# Patient Record
Sex: FEMALE | Race: WHITE | NOT HISPANIC OR LATINO | Employment: FULL TIME | URBAN - METROPOLITAN AREA
[De-identification: names, ages, dates, MRNs, and addresses within clinical notes are randomized per-mention and may not be internally consistent; named-entity substitution may affect disease eponyms.]

---

## 2017-06-13 DIAGNOSIS — Z12.31 SCREENING MAMMOGRAM FOR HIGH-RISK PATIENT: Primary | ICD-10-CM

## 2017-06-22 ENCOUNTER — HOSPITAL ENCOUNTER (OUTPATIENT)
Dept: RADIOLOGY | Facility: HOSPITAL | Age: 72
Discharge: HOME | End: 2017-06-22
Attending: OBSTETRICS & GYNECOLOGY
Payer: MEDICARE

## 2017-06-22 VITALS — HEIGHT: 61 IN | WEIGHT: 182 LBS | BODY MASS INDEX: 34.36 KG/M2

## 2017-06-22 DIAGNOSIS — Z12.31 SCREENING MAMMOGRAM FOR HIGH-RISK PATIENT: ICD-10-CM

## 2017-06-22 PROCEDURE — 77067 SCR MAMMO BI INCL CAD: CPT | Mod: TC

## 2017-06-22 PROCEDURE — 77067 SCR MAMMO BI INCL CAD: CPT | Mod: 26,,, | Performed by: RADIOLOGY

## 2017-06-22 PROCEDURE — 77063 BREAST TOMOSYNTHESIS BI: CPT | Mod: 26,,, | Performed by: RADIOLOGY

## 2018-07-31 DIAGNOSIS — Z12.31 VISIT FOR SCREENING MAMMOGRAM: Primary | ICD-10-CM

## 2018-08-15 ENCOUNTER — HOSPITAL ENCOUNTER (OUTPATIENT)
Dept: RADIOLOGY | Facility: HOSPITAL | Age: 73
Discharge: HOME | End: 2018-08-15
Attending: OBSTETRICS & GYNECOLOGY
Payer: MEDICARE

## 2018-08-15 VITALS — BODY MASS INDEX: 34.39 KG/M2 | WEIGHT: 182 LBS

## 2018-08-15 DIAGNOSIS — Z12.31 VISIT FOR SCREENING MAMMOGRAM: ICD-10-CM

## 2018-08-15 PROCEDURE — 77067 SCR MAMMO BI INCL CAD: CPT | Mod: TC

## 2018-08-15 PROCEDURE — 77067 SCR MAMMO BI INCL CAD: CPT | Mod: 26,,, | Performed by: RADIOLOGY

## 2019-09-16 DIAGNOSIS — Z12.39 SCREENING BREAST EXAMINATION: Primary | ICD-10-CM

## 2019-09-25 ENCOUNTER — HOSPITAL ENCOUNTER (OUTPATIENT)
Dept: RADIOLOGY | Facility: HOSPITAL | Age: 74
Discharge: HOME | End: 2019-09-25
Attending: OBSTETRICS & GYNECOLOGY
Payer: MEDICARE

## 2019-09-25 VITALS — BODY MASS INDEX: 34.39 KG/M2 | WEIGHT: 182 LBS

## 2019-09-25 DIAGNOSIS — Z12.39 SCREENING BREAST EXAMINATION: ICD-10-CM

## 2019-09-25 PROCEDURE — 77067 SCR MAMMO BI INCL CAD: CPT | Mod: TC

## 2019-09-25 PROCEDURE — 77063 BREAST TOMOSYNTHESIS BI: CPT | Mod: 26,,, | Performed by: RADIOLOGY

## 2019-09-25 PROCEDURE — 77067 MAMMO DIGITAL SCREENING BILAT WITH TOMOSYNTHESIS_CAD: ICD-10-PCS | Mod: 26,,, | Performed by: RADIOLOGY

## 2019-09-25 PROCEDURE — 77063 MAMMO DIGITAL SCREENING BILAT WITH TOMOSYNTHESIS_CAD: ICD-10-PCS | Mod: 26,,, | Performed by: RADIOLOGY

## 2019-09-25 PROCEDURE — 77067 SCR MAMMO BI INCL CAD: CPT | Mod: 26,,, | Performed by: RADIOLOGY

## 2020-11-30 DIAGNOSIS — Z12.31 VISIT FOR SCREENING MAMMOGRAM: Primary | ICD-10-CM

## 2020-12-03 ENCOUNTER — HOSPITAL ENCOUNTER (OUTPATIENT)
Dept: RADIOLOGY | Facility: HOSPITAL | Age: 75
Discharge: HOME | End: 2020-12-03
Attending: OBSTETRICS & GYNECOLOGY
Payer: MEDICARE

## 2020-12-03 DIAGNOSIS — Z12.31 VISIT FOR SCREENING MAMMOGRAM: ICD-10-CM

## 2020-12-03 PROCEDURE — 77063 BREAST TOMOSYNTHESIS BI: CPT | Mod: 26,,, | Performed by: RADIOLOGY

## 2020-12-03 PROCEDURE — 77063 MAMMO DIGITAL SCREENING BILAT WITH TOMO: ICD-10-PCS | Mod: 26,,, | Performed by: RADIOLOGY

## 2020-12-03 PROCEDURE — 77067 MAMMO DIGITAL SCREENING BILAT WITH TOMO: ICD-10-PCS | Mod: 26,,, | Performed by: RADIOLOGY

## 2020-12-03 PROCEDURE — 77067 SCR MAMMO BI INCL CAD: CPT | Mod: 26,,, | Performed by: RADIOLOGY

## 2020-12-03 PROCEDURE — 77067 SCR MAMMO BI INCL CAD: CPT | Mod: TC

## 2020-12-06 DIAGNOSIS — Z12.31 SCREENING MAMMOGRAM, ENCOUNTER FOR: Primary | ICD-10-CM

## 2021-04-16 ENCOUNTER — PATIENT MESSAGE (OUTPATIENT)
Dept: RESEARCH | Facility: HOSPITAL | Age: 76
End: 2021-04-16

## 2022-03-10 ENCOUNTER — HOSPITAL ENCOUNTER (OUTPATIENT)
Dept: RADIOLOGY | Facility: HOSPITAL | Age: 77
Discharge: HOME | End: 2022-03-10
Attending: OBSTETRICS & GYNECOLOGY
Payer: MEDICARE

## 2022-03-10 VITALS — WEIGHT: 178 LBS | BODY MASS INDEX: 33.63 KG/M2

## 2022-03-10 DIAGNOSIS — Z12.31 VISIT FOR SCREENING MAMMOGRAM: ICD-10-CM

## 2022-03-10 PROCEDURE — 77063 BREAST TOMOSYNTHESIS BI: CPT | Mod: TC

## 2022-03-10 PROCEDURE — 77067 SCR MAMMO BI INCL CAD: CPT | Mod: 26,,, | Performed by: RADIOLOGY

## 2022-03-10 PROCEDURE — 77063 BREAST TOMOSYNTHESIS BI: CPT | Mod: 26,,, | Performed by: RADIOLOGY

## 2022-03-10 PROCEDURE — 77063 MAMMO DIGITAL SCREENING BILAT WITH TOMO: ICD-10-PCS | Mod: 26,,, | Performed by: RADIOLOGY

## 2022-03-10 PROCEDURE — 77067 MAMMO DIGITAL SCREENING BILAT WITH TOMO: ICD-10-PCS | Mod: 26,,, | Performed by: RADIOLOGY

## 2022-10-23 ENCOUNTER — HOSPITAL ENCOUNTER (OUTPATIENT)
Facility: HOSPITAL | Age: 77
Discharge: HOME | End: 2022-10-24
Attending: EMERGENCY MEDICINE | Admitting: EMERGENCY MEDICINE
Payer: MEDICARE

## 2022-10-23 DIAGNOSIS — D72.829 LEUKOCYTOSIS, UNSPECIFIED TYPE: Primary | ICD-10-CM

## 2022-10-23 DIAGNOSIS — R07.9 CHEST PAIN: ICD-10-CM

## 2022-10-23 PROBLEM — G47.33 OSA (OBSTRUCTIVE SLEEP APNEA): Status: ACTIVE | Noted: 2022-10-23

## 2022-10-23 LAB
ALBUMIN SERPL-MCNC: 4.3 G/DL (ref 3.3–5.5)
ALP SERPL-CCNC: 68 U/L (ref 42–141)
BILIRUB SERPL-MCNC: 0.6 MG/DL (ref 0.2–1.6)
BILIRUBIN, POC UA: NEGATIVE
BLOOD, POC UA: ABNORMAL
BUN SERPL-MCNC: 19 MG/DL (ref 7–22)
CALCIUM SERPL-MCNC: 10.2 MG/DL (ref 8–10.3)
CHLORIDE SERPL-SCNC: 106 MMOL/L (ref 98–108)
CLARITY, POC UA: CLEAR
COLOR, POC UA: YELLOW
CREAT SERPL-MCNC: 1.2 MG/DL (ref 0.6–1.2)
GLUCOSE SERPL-MCNC: 148 MG/DL (ref 73–118)
GLUCOSE, POC UA: NEGATIVE
INFLUENZA A ANTIGEN, POC: NEGATIVE
INFLUENZA B ANTIGEN, POC: NEGATIVE
KETONES, POC UA: NEGATIVE
LEUKOCYTE EST, POC UA: NEGATIVE
NITRITE, POC UA: NEGATIVE
PH UR STRIP: 5.5 [PH]
POC ALT (SGPT): 24 U/L (ref 10–47)
POC AST (SGOT): 34 U/L (ref 11–38)
POC CARDIAC TROPONIN I: 0.01 NG/ML
POC CARDIAC TROPONIN I: 0.01 NG/ML
POC PTINR: 1.2 (ref 0.9–1.2)
POC PTWBT: 13.7 SEC (ref 9.7–14.3)
POC TCO2: 26 MMOL/L (ref 18–33)
POTASSIUM BLD-SCNC: 4.8 MMOL/L (ref 3.6–5.1)
PROTEIN, POC UA: NEGATIVE
PROTEIN, POC: 8.1 G/DL (ref 6.4–8.1)
SAMPLE: NORMAL
SODIUM BLD-SCNC: 146 MMOL/L (ref 128–145)
SPECIFIC GRAVITY, POC UA: 1.02
TROPONIN I SERPL DL<=0.01 NG/ML-MCNC: 0.03 NG/ML (ref 0–0.03)
UROBILINOGEN, POC UA: 0.2 E.U./DL

## 2022-10-23 PROCEDURE — 84484 ASSAY OF TROPONIN QUANT: CPT | Mod: 91,ER

## 2022-10-23 PROCEDURE — G0378 HOSPITAL OBSERVATION PER HR: HCPCS

## 2022-10-23 PROCEDURE — 36415 COLL VENOUS BLD VENIPUNCTURE: CPT | Performed by: PHYSICIAN ASSISTANT

## 2022-10-23 PROCEDURE — 84484 ASSAY OF TROPONIN QUANT: CPT | Mod: ER

## 2022-10-23 PROCEDURE — 85610 PROTHROMBIN TIME: CPT | Mod: ER

## 2022-10-23 PROCEDURE — 80053 COMPREHEN METABOLIC PANEL: CPT | Mod: ER

## 2022-10-23 PROCEDURE — 93010 ELECTROCARDIOGRAM REPORT: CPT | Mod: ,,, | Performed by: INTERNAL MEDICINE

## 2022-10-23 PROCEDURE — 84484 ASSAY OF TROPONIN QUANT: CPT | Mod: 59 | Performed by: PHYSICIAN ASSISTANT

## 2022-10-23 PROCEDURE — 85025 COMPLETE CBC W/AUTO DIFF WBC: CPT | Mod: ER

## 2022-10-23 PROCEDURE — 25000003 PHARM REV CODE 250: Performed by: STUDENT IN AN ORGANIZED HEALTH CARE EDUCATION/TRAINING PROGRAM

## 2022-10-23 PROCEDURE — 81003 URINALYSIS AUTO W/O SCOPE: CPT | Mod: ER

## 2022-10-23 PROCEDURE — 93010 EKG 12-LEAD: ICD-10-PCS | Mod: ,,, | Performed by: INTERNAL MEDICINE

## 2022-10-23 PROCEDURE — 99285 EMERGENCY DEPT VISIT HI MDM: CPT | Mod: 25,ER

## 2022-10-23 PROCEDURE — 25000003 PHARM REV CODE 250: Performed by: PHYSICIAN ASSISTANT

## 2022-10-23 PROCEDURE — 25000003 PHARM REV CODE 250: Mod: ER | Performed by: NURSE PRACTITIONER

## 2022-10-23 PROCEDURE — 93005 ELECTROCARDIOGRAM TRACING: CPT | Mod: ER

## 2022-10-23 RX ORDER — DICYCLOMINE HYDROCHLORIDE 10 MG/5ML
20 SOLUTION ORAL
Status: COMPLETED | OUTPATIENT
Start: 2022-10-23 | End: 2022-10-23

## 2022-10-23 RX ORDER — EZETIMIBE 10 MG/1
10 TABLET ORAL NIGHTLY
Status: DISCONTINUED | OUTPATIENT
Start: 2022-10-23 | End: 2022-10-23

## 2022-10-23 RX ORDER — ASPIRIN 325 MG
325 TABLET ORAL
Status: DISCONTINUED | OUTPATIENT
Start: 2022-10-23 | End: 2022-10-23

## 2022-10-23 RX ORDER — IBUPROFEN 200 MG
16 TABLET ORAL
Status: DISCONTINUED | OUTPATIENT
Start: 2022-10-23 | End: 2022-10-24 | Stop reason: HOSPADM

## 2022-10-23 RX ORDER — ROSUVASTATIN CALCIUM 10 MG/1
10 TABLET, COATED ORAL NIGHTLY
Status: DISCONTINUED | OUTPATIENT
Start: 2022-10-23 | End: 2022-10-24 | Stop reason: HOSPADM

## 2022-10-23 RX ORDER — LIDOCAINE 50 MG/G
1 PATCH TOPICAL
Status: DISCONTINUED | OUTPATIENT
Start: 2022-10-23 | End: 2022-10-24 | Stop reason: HOSPADM

## 2022-10-23 RX ORDER — SODIUM CHLORIDE 0.9 % (FLUSH) 0.9 %
10 SYRINGE (ML) INJECTION EVERY 8 HOURS
Status: DISCONTINUED | OUTPATIENT
Start: 2022-10-23 | End: 2022-10-23

## 2022-10-23 RX ORDER — SODIUM CHLORIDE 0.9 % (FLUSH) 0.9 %
10 SYRINGE (ML) INJECTION
Status: DISCONTINUED | OUTPATIENT
Start: 2022-10-23 | End: 2022-10-24 | Stop reason: HOSPADM

## 2022-10-23 RX ORDER — IBUPROFEN 200 MG
24 TABLET ORAL
Status: DISCONTINUED | OUTPATIENT
Start: 2022-10-23 | End: 2022-10-24 | Stop reason: HOSPADM

## 2022-10-23 RX ORDER — TALC
6 POWDER (GRAM) TOPICAL NIGHTLY PRN
Status: DISCONTINUED | OUTPATIENT
Start: 2022-10-23 | End: 2022-10-24 | Stop reason: HOSPADM

## 2022-10-23 RX ORDER — LIDOCAINE HYDROCHLORIDE 20 MG/ML
15 SOLUTION OROPHARYNGEAL
Status: COMPLETED | OUTPATIENT
Start: 2022-10-23 | End: 2022-10-23

## 2022-10-23 RX ORDER — LANOLIN ALCOHOL/MO/W.PET/CERES
800 CREAM (GRAM) TOPICAL
Status: DISCONTINUED | OUTPATIENT
Start: 2022-10-23 | End: 2022-10-24 | Stop reason: HOSPADM

## 2022-10-23 RX ORDER — NALOXONE HCL 0.4 MG/ML
0.02 VIAL (ML) INJECTION
Status: DISCONTINUED | OUTPATIENT
Start: 2022-10-23 | End: 2022-10-24 | Stop reason: HOSPADM

## 2022-10-23 RX ORDER — MAG HYDROX/ALUMINUM HYD/SIMETH 200-200-20
30 SUSPENSION, ORAL (FINAL DOSE FORM) ORAL
Status: COMPLETED | OUTPATIENT
Start: 2022-10-23 | End: 2022-10-23

## 2022-10-23 RX ORDER — FUROSEMIDE 20 MG/1
20 TABLET ORAL DAILY
COMMUNITY

## 2022-10-23 RX ORDER — NAPROXEN SODIUM 220 MG/1
81 TABLET, FILM COATED ORAL DAILY
Status: DISCONTINUED | OUTPATIENT
Start: 2022-10-24 | End: 2022-10-24 | Stop reason: HOSPADM

## 2022-10-23 RX ORDER — AMOXICILLIN 250 MG
1 CAPSULE ORAL DAILY PRN
Status: DISCONTINUED | OUTPATIENT
Start: 2022-10-23 | End: 2022-10-24 | Stop reason: HOSPADM

## 2022-10-23 RX ORDER — GLUCAGON 1 MG
1 KIT INJECTION
Status: DISCONTINUED | OUTPATIENT
Start: 2022-10-23 | End: 2022-10-24 | Stop reason: HOSPADM

## 2022-10-23 RX ORDER — FUROSEMIDE 20 MG/1
20 TABLET ORAL DAILY
Status: DISCONTINUED | OUTPATIENT
Start: 2022-10-24 | End: 2022-10-24 | Stop reason: HOSPADM

## 2022-10-23 RX ORDER — ACETAMINOPHEN 325 MG/1
650 TABLET ORAL EVERY 4 HOURS PRN
Status: DISCONTINUED | OUTPATIENT
Start: 2022-10-23 | End: 2022-10-24 | Stop reason: HOSPADM

## 2022-10-23 RX ORDER — EZETIMIBE 10 MG/1
10 TABLET ORAL DAILY
Status: DISCONTINUED | OUTPATIENT
Start: 2022-10-24 | End: 2022-10-24 | Stop reason: HOSPADM

## 2022-10-23 RX ORDER — ATORVASTATIN CALCIUM 10 MG/1
20 TABLET, FILM COATED ORAL DAILY
Status: DISCONTINUED | OUTPATIENT
Start: 2022-10-24 | End: 2022-10-23

## 2022-10-23 RX ADMIN — ROSUVASTATIN CALCIUM 10 MG: 10 TABLET, FILM COATED ORAL at 11:10

## 2022-10-23 RX ADMIN — SACUBITRIL AND VALSARTAN 2 TABLET: 24; 26 TABLET, FILM COATED ORAL at 03:10

## 2022-10-23 RX ADMIN — DICYCLOMINE HYDROCHLORIDE 20 MG: 10 SOLUTION ORAL at 10:10

## 2022-10-23 RX ADMIN — ALUMINUM HYDROXIDE, MAGNESIUM HYDROXIDE, AND DIMETHICONE 30 ML: 200; 20; 200 SUSPENSION ORAL at 10:10

## 2022-10-23 RX ADMIN — LIDOCAINE 1 PATCH: 50 PATCH TOPICAL at 03:10

## 2022-10-23 RX ADMIN — LIDOCAINE HYDROCHLORIDE 15 ML: 20 SOLUTION ORAL at 10:10

## 2022-10-23 NOTE — ASSESSMENT & PLAN NOTE
Presents with constant chest pain since 2am improved with GI cocktail in ED, and worsened with palpation of chest. EKG without ST elevation, troponin negative x2 in [place]. Possibly MSK related or GERD.  Troponin trend  Continue home aspirin/zetia substiute home rosuvastatin for atorvastatin  Telemetry  Echo  Will place lidocaine patch.

## 2022-10-23 NOTE — ED NOTES
No medication reactions, pt states no change in chest pressure at this time but states she is no longer belching.

## 2022-10-23 NOTE — H&P
"[facility]  History & Physical    Patient Name: Janice Rivera  MRN: [id number]  Patient Class: OP- Observation  Admission Date: 10/23/2022  Attending Physician: Sandy Gutierrez MD   Primary Care Provider: Juli Melendez MD         Patient information was obtained from patient, past medical records and ER records.     Subjective:     Principal Problem:Chest pain    Chief Complaint:   Chief Complaint   Patient presents with    Chest Pain    Fever     Pt states upper chest pain that woke her up this am around 2,  pt also c/o fever, cough and nausea that has subsided.  Pt states she has been belching.        HPI: Janice Rivera 77 y.o. female with HFpEF, polymalgia rheumatic no longer on Tx, JUNIOR presents to [facility] wit a chief complaint of chest pain.  She reports morning at 2:00 a.m. she developed sudden onset sternal chest pain at the base of her neck that she describes as a squeezing pain.  And has remained constant  Improved mildly with GI cocktail in the ED.  It worsens with palpation of her chest.  She attempted treatment at home with aspirin.  She reports sleeping on both of her sides, and is concerned this could have contributed to her chest pain as she states her bed is "old." She denies shortness breath nausea vomiting abdominal pain leg swelling syncope dizziness dysuria melena hematuria hematemesis new rashes or wounds.      In the ED, EKG without ST elevation, troponin negative, chest x-ray with pulmonary vascular congestion without overt edema.     Stress Test 2019 negative for ischemia.     04/24/18  Coronary calcium score is 209, 88th percentile.  Coronary age 79.  Ten year risk for a heart attack 10%, that would be intermediate to high risk.  Normal origin of the right and left main.  The left atrium is 35 mm.  Plaque also noted in the mid distal RCA below the Agatston's scoring threshold.  Progression from 2010 when the score was 32.  Liver 43, spleen 31.  " No evidence of fatty liver from this study.  Radiology overread showed a tiny hiatal hernia significantly decreased in size from the prior exam.      Past Medical History:   Diagnosis Date    Enlarged heart     Hypertension     Polymyalgia rheumatica        History reviewed. No pertinent surgical history.    Review of patient's allergies indicates:   Allergen Reactions    Hydrochlorothiazide        No current facility-administered medications on file prior to encounter.     Current Outpatient Medications on File Prior to Encounter   Medication Sig    amlodipine (NORVASC) 5 MG tablet Take 5 mg by mouth once daily.    aspirin 81 MG Chew Take 81 mg by mouth once daily.    atenolol (TENORMIN) 50 MG tablet Take 50 mg by mouth 2 (two) times daily.    biotin 1 mg tablet Take 1,000 mcg by mouth 3 (three) times daily.    CALCIUM CARBONATE/VITAMIN D3 (VITAMIN D-3 ORAL) Take 2,000 Int'l Units/day by mouth.    CALCIUM PHOSPHATE TRIB/VIT D3 (CITRACAL + D ORAL) Take by mouth.    folic acid (FOLVITE) 1 MG tablet Take 1 mg by mouth once daily.    methotrexate 5 MG tablet Take 10 mg by mouth once a week.    olmesartan (BENICAR) 20 MG tablet Take 20 mg by mouth once daily.    OMEGA-3 FATTY ACIDS/FISH OIL (OMEGA 3 FISH OIL ORAL) Take by mouth.    peg 400-propylene glycol 0.4-0.3 % Drop Apply to eye.    predniSONE (DELTASONE) 5 MG tablet Take 5 mg by mouth once daily.    pyridoxine (VITAMIN B-6) 100 MG Tab Take 50 mg by mouth once daily.    vitamin D 1000 units Tab Take 185 mg by mouth once daily.     Family History    Sister-CAD       Tobacco Use    Smoking status: Never    Smokeless tobacco: Not on file   Substance and Sexual Activity    Alcohol use: Not on file    Drug use: Not on file    Sexual activity: Not on file     Review of Systems   Constitutional:  Negative for chills and fever.   HENT:  Negative for nosebleeds and tinnitus.    Eyes:  Negative for photophobia and visual disturbance.   Respiratory:   Negative for shortness of breath and wheezing.    Cardiovascular:  Positive for chest pain. Negative for palpitations and leg swelling.   Gastrointestinal:  Negative for abdominal distention, nausea and vomiting.   Genitourinary:  Negative for dysuria, flank pain and hematuria.   Musculoskeletal:  Negative for gait problem and joint swelling.   Skin:  Negative for rash and wound.   Neurological:  Negative for seizures and syncope.   Objective:     Vital Signs (Most Recent):  Temp: 99 °F (37.2 °C) (10/23/22 1206)  Pulse: 75 (10/23/22 1231)  Resp: 17 (10/23/22 1101)  BP: (!) 157/74 (10/23/22 1231)  SpO2: 96 % (10/23/22 1231)   Vital Signs (24h Range):  Temp:  [98.9 °F (37.2 °C)-99.1 °F (37.3 °C)] 99 °F (37.2 °C)  Pulse:  [62-76] 75  Resp:  [17-20] 17  SpO2:  [96 %-99 %] 96 %  BP: (156-203)/(68-86) 157/74     Weight: 84.2 kg (185 lb 9.6 oz)  Body mass index is 35.07 kg/m².    Physical Exam  Vitals and nursing note reviewed.   Constitutional:       General: She is not in acute distress.     Appearance: She is well-developed.   HENT:      Head: Normocephalic and atraumatic.   Eyes:      General:         Right eye: No discharge.         Left eye: No discharge.      Conjunctiva/sclera: Conjunctivae normal.   Neck:      Thyroid: No thyromegaly.   Cardiovascular:      Rate and Rhythm: Normal rate and regular rhythm.      Heart sounds: No murmur heard.  Pulmonary:      Effort: Pulmonary effort is normal. No respiratory distress.      Breath sounds: Normal breath sounds.   Chest:      Chest wall: Tenderness (across sternal manubrium reproduces pain of cheif complaint) present.   Abdominal:      General: Bowel sounds are normal. There is no distension.      Palpations: Abdomen is soft. There is no mass.      Tenderness: There is no abdominal tenderness.   Musculoskeletal:         General: No deformity.      Cervical back: Normal range of motion.   Skin:     General: Skin is warm and dry.      Findings: No lesion or rash.    Neurological:      Mental Status: She is alert and oriented to person, place, and time.   Psychiatric:         Mood and Affect: Mood normal.         Behavior: Behavior normal.           Significant Labs:   CBC  WBC: 13.4  H&H: 15.6&47.5  PLTs: 270    CMP  Sodium: 146  Potassium: 4.8  Chloride: 106  BUN: 19  Cr: 1.2  Alk Phos: 68  Albumin: 4.3  AST/ALT: 34/24  Troponin: 0.01 and 0.01 INR: 1.2  Glucose: 148  Calcium: 10.2  Flu negative      UA negative for glucose, protein, ketones, nitrites, leukocytes    Significant Imaging:   Imaging Results              X-Ray Chest PA And Lateral (Final result)  Result time 10/23/22 11:13:14      Final result by Veronica Collins MD (10/23/22 11:13:14)                   Impression:      1. Pulmonary vascular congestion without definite edema.  2. Stable enlarged cardiac silhouette.  3. Osteopenia and degenerative change of the spine.      Electronically signed by: Veronica Collins MD  Date:    10/23/2022  Time:    11:13               Narrative:    EXAMINATION:  XR CHEST PA AND LATERAL    CLINICAL HISTORY:  Chest Pain;    TECHNIQUE:  PA and lateral views of the chest were performed.    COMPARISON:  Prior dated 09/22/2011    FINDINGS:  The mediastinal structures are midline.  The cardiac silhouette is mildly enlarged and stable.  There is pulmonary vascular congestion without definite edema.  There is osteopenia and degenerative change of the spine.                                        Assessment/Plan:     * Chest pain  Presents with constant chest pain since 2am improved with GI cocktail in ED, and worsened with palpation of chest. EKG without ST elevation, troponin negative x2 in [place]. Possibly MSK related or GERD.  Troponin trend  Continue home aspirin/zetia substiute home rosuvastatin for atorvastatin  Telemetry  Echo  Will place lidocaine patch.     JUNIOR (obstructive sleep apnea)  CPAP nightly    Polymyalgia rheumatica  Stable no longer on medication outpatient.  Denies any shoulder stiffness today. Chest pain across top of sternum appears to be MSK    Diastolic dysfunction  Echo 8/2022 with grade 1 DD. Appears euvolemic on exam  Continue home entresto and lasix      VTE Risk Mitigation (From admission, onward)         Ordered     IP VTE HIGH RISK PATIENT  Once         10/23/22 1318     Place sequential compression device  Until discontinued         10/23/22 1318     Place PATRICIA hose  Until discontinued         10/23/22 1318              VTE: PATRICIA/SCD  Code: Full  Diet: cardiac  Dispo: pending troponin trend and echo  As clarification, on 10/23/2022, patient should be admitted for hospital observation services under my care in collaboration with Sandy Gutierrez MD. Dawit Carranza PA-C  Department of Hospital Medicine   [facility]

## 2022-10-23 NOTE — SUBJECTIVE & OBJECTIVE
Past Medical History:   Diagnosis Date    Enlarged heart     Hypertension     Polymyalgia rheumatica        History reviewed. No pertinent surgical history.    Review of patient's allergies indicates:   Allergen Reactions    Hydrochlorothiazide        No current facility-administered medications on file prior to encounter.     Current Outpatient Medications on File Prior to Encounter   Medication Sig    amlodipine (NORVASC) 5 MG tablet Take 5 mg by mouth once daily.    aspirin 81 MG Chew Take 81 mg by mouth once daily.    atenolol (TENORMIN) 50 MG tablet Take 50 mg by mouth 2 (two) times daily.    biotin 1 mg tablet Take 1,000 mcg by mouth 3 (three) times daily.    CALCIUM CARBONATE/VITAMIN D3 (VITAMIN D-3 ORAL) Take 2,000 Int'l Units/day by mouth.    CALCIUM PHOSPHATE TRIB/VIT D3 (CITRACAL + D ORAL) Take by mouth.    folic acid (FOLVITE) 1 MG tablet Take 1 mg by mouth once daily.    methotrexate 5 MG tablet Take 10 mg by mouth once a week.    olmesartan (BENICAR) 20 MG tablet Take 20 mg by mouth once daily.    OMEGA-3 FATTY ACIDS/FISH OIL (OMEGA 3 FISH OIL ORAL) Take by mouth.    peg 400-propylene glycol 0.4-0.3 % Drop Apply to eye.    predniSONE (DELTASONE) 5 MG tablet Take 5 mg by mouth once daily.    pyridoxine (VITAMIN B-6) 100 MG Tab Take 50 mg by mouth once daily.    vitamin D 1000 units Tab Take 185 mg by mouth once daily.     Family History    Sister-CAD       Tobacco Use    Smoking status: Never    Smokeless tobacco: Not on file   Substance and Sexual Activity    Alcohol use: Not on file    Drug use: Not on file    Sexual activity: Not on file     Review of Systems   Constitutional:  Negative for chills and fever.   HENT:  Negative for nosebleeds and tinnitus.    Eyes:  Negative for photophobia and visual disturbance.   Respiratory:  Negative for shortness of breath and wheezing.    Cardiovascular:  Positive for chest pain. Negative for palpitations and leg swelling.   Gastrointestinal:  Negative for  abdominal distention, nausea and vomiting.   Genitourinary:  Negative for dysuria, flank pain and hematuria.   Musculoskeletal:  Negative for gait problem and joint swelling.   Skin:  Negative for rash and wound.   Neurological:  Negative for seizures and syncope.   Objective:     Vital Signs (Most Recent):  Temp: 99 °F (37.2 °C) (10/23/22 1206)  Pulse: 75 (10/23/22 1231)  Resp: 17 (10/23/22 1101)  BP: (!) 157/74 (10/23/22 1231)  SpO2: 96 % (10/23/22 1231)   Vital Signs (24h Range):  Temp:  [98.9 °F (37.2 °C)-99.1 °F (37.3 °C)] 99 °F (37.2 °C)  Pulse:  [62-76] 75  Resp:  [17-20] 17  SpO2:  [96 %-99 %] 96 %  BP: (156-203)/(68-86) 157/74     Weight: 84.2 kg (185 lb 9.6 oz)  Body mass index is 35.07 kg/m².    Physical Exam  Vitals and nursing note reviewed.   Constitutional:       General: She is not in acute distress.     Appearance: She is well-developed.   HENT:      Head: Normocephalic and atraumatic.   Eyes:      General:         Right eye: No discharge.         Left eye: No discharge.      Conjunctiva/sclera: Conjunctivae normal.   Neck:      Thyroid: No thyromegaly.   Cardiovascular:      Rate and Rhythm: Normal rate and regular rhythm.      Heart sounds: No murmur heard.  Pulmonary:      Effort: Pulmonary effort is normal. No respiratory distress.      Breath sounds: Normal breath sounds.   Chest:      Chest wall: Tenderness (across sternal manubrium reproduces pain of cheif complaint) present.   Abdominal:      General: Bowel sounds are normal. There is no distension.      Palpations: Abdomen is soft. There is no mass.      Tenderness: There is no abdominal tenderness.   Musculoskeletal:         General: No deformity.      Cervical back: Normal range of motion.   Skin:     General: Skin is warm and dry.      Findings: No lesion or rash.   Neurological:      Mental Status: She is alert and oriented to person, place, and time.   Psychiatric:         Mood and Affect: Mood normal.         Behavior: Behavior  normal.           Significant Labs:   CBC  WBC: 13.4  H&H: 15.6&47.5  PLTs: 270    CMP  Sodium: 146  Potassium: 4.8  Chloride: 106  BUN: 19  Cr: 1.2  Alk Phos: 68  Albumin: 4.3  AST/ALT: 34/24  Troponin: 0.01 and 0.01 INR: 1.2  Glucose: 148  Calcium: 10.2  Flu negative      UA negative for glucose, protein, ketones, nitrites, leukocytes    Significant Imaging:   Imaging Results              X-Ray Chest PA And Lateral (Final result)  Result time 10/23/22 11:13:14      Final result by Veronica Collins MD (10/23/22 11:13:14)                   Impression:      1. Pulmonary vascular congestion without definite edema.  2. Stable enlarged cardiac silhouette.  3. Osteopenia and degenerative change of the spine.      Electronically signed by: Veronica Collins MD  Date:    10/23/2022  Time:    11:13               Narrative:    EXAMINATION:  XR CHEST PA AND LATERAL    CLINICAL HISTORY:  Chest Pain;    TECHNIQUE:  PA and lateral views of the chest were performed.    COMPARISON:  Prior dated 09/22/2011    FINDINGS:  The mediastinal structures are midline.  The cardiac silhouette is mildly enlarged and stable.  There is pulmonary vascular congestion without definite edema.  There is osteopenia and degenerative change of the spine.

## 2022-10-23 NOTE — ASSESSMENT & PLAN NOTE
Stable no longer on medication outpatient. Denies any shoulder stiffness today. Chest pain across top of sternum appears to be MSK

## 2022-10-23 NOTE — NURSING
Patient arrived to the floor from [facility].  Patient AAOX4, c/o aching chest discomfort 5/10.   States her GI cocktail worked but it has worn off.  BP elevated 191/76, ENIO Carranza notified and will place orders for dose of entresto.  Tele applied, 0091.  Patient denies SOB.  Acclimated to the unit, call bell in reach.  Will continue to monitor.

## 2022-10-23 NOTE — ED PROVIDER NOTES
Encounter Date: 10/23/2022       History     Chief Complaint   Patient presents with    Chest Pain    Fever     Pt states upper chest pain that woke her up this am around 2,  pt also c/o fever, cough and nausea that has subsided.  Pt states she has been belching.     Chief complaint: Chest pain     History of present illness:  Patient is a 77-year-old female with a history of coronary artery disease, hypertension, hyperlipidemia who presents with upper midsternal chest pain that started at 02:00 and woke her from sleep.  She reports it is intermittent and throbbing .  It is made worse with deep breath or cough.  She states that it does not radiate it is currently a 4/10.  It is accompanied by eructation.  She reports she had a low-grade temperature of 100.1° F denies congestion runny nose appetite change shortness of breath abdominal pain diarrhea vomiting constipation rash and ear pain.  She endorses postnasal drainage, watery itchy eyes and nausea.    The history is provided by the patient. No  was used.   Review of patient's allergies indicates:   Allergen Reactions    Hydrochlorothiazide      Past Medical History:   Diagnosis Date    Enlarged heart     Hypertension     Polymyalgia rheumatica      History reviewed. No pertinent surgical history.  History reviewed. No pertinent family history.  Social History     Tobacco Use    Smoking status: Never     Review of Systems   Constitutional:  Positive for fever. Negative for chills and fatigue.   HENT:  Positive for postnasal drip. Negative for congestion, ear discharge, ear pain, rhinorrhea, sinus pressure, sneezing, sore throat and voice change.    Eyes:  Positive for discharge and itching.   Respiratory:  Positive for cough and chest tightness. Negative for shortness of breath and wheezing.    Cardiovascular:  Negative for chest pain, palpitations and leg swelling.   Gastrointestinal:  Positive for nausea. Negative for abdominal pain,  constipation, diarrhea and vomiting.   Endocrine: Negative for polydipsia, polyphagia and polyuria.   Genitourinary:  Negative for dysuria, frequency, hematuria, urgency, vaginal bleeding, vaginal discharge and vaginal pain.   Musculoskeletal:  Negative for arthralgias and myalgias.   Skin:  Negative for rash and wound.   Neurological:  Negative for dizziness, seizures, syncope, weakness and numbness.   Hematological:  Negative for adenopathy. Does not bruise/bleed easily.   Psychiatric/Behavioral:  Negative for self-injury and suicidal ideas. The patient is not nervous/anxious.      Physical Exam     Initial Vitals [10/23/22 1001]   BP Pulse Resp Temp SpO2   (!) 203/81 76 17 99.1 °F (37.3 °C) 98 %      MAP       --         Physical Exam    Nursing note and vitals reviewed.  Constitutional: She appears well-developed and well-nourished.   HENT:   Head: Normocephalic and atraumatic.   Right Ear: Hearing, tympanic membrane, external ear and ear canal normal.   Left Ear: Hearing, tympanic membrane, external ear and ear canal normal.   Nose: Nose normal. No mucosal edema or rhinorrhea. No epistaxis. Right sinus exhibits no maxillary sinus tenderness and no frontal sinus tenderness. Left sinus exhibits no maxillary sinus tenderness and no frontal sinus tenderness.   Mouth/Throat: Uvula is midline, oropharynx is clear and moist and mucous membranes are normal. No oral lesions. Normal dentition.   Eyes: Conjunctivae and EOM are normal. Pupils are equal, round, and reactive to light. Right eye exhibits no discharge. Left eye exhibits no discharge.   Neck:   Normal range of motion.  Cardiovascular:  Regular rhythm, S1 normal, S2 normal and normal heart sounds.     Exam reveals no gallop.       No murmur heard.  Pulmonary/Chest: Effort normal and breath sounds normal. No respiratory distress. She has no decreased breath sounds. She has no wheezes. She has no rhonchi. She has no rales.   Abdominal: She exhibits no distension.    Musculoskeletal:         General: Normal range of motion.      Cervical back: Normal range of motion.     Neurological: She is alert and oriented to person, place, and time.   Skin: Skin is dry. Capillary refill takes less than 2 seconds.       ED Course   Procedures  Labs Reviewed   POCT URINALYSIS W/O SCOPE - Abnormal; Notable for the following components:       Result Value    Blood, UA Trace-intact (*)     All other components within normal limits   POCT CMP - Abnormal; Notable for the following components:    POC Glucose 148 (*)     POC Sodium 146 (*)     All other components within normal limits   TROPONIN ISTAT   POCT CBC   POCT URINALYSIS W/O SCOPE   POCT INFLUENZA A/B MOLECULAR   POCT CMP   POCT PROTIME-INR   POCT TROPONIN   ISTAT PROCEDURE   POCT RAPID INFLUENZA A/B          Imaging Results              X-Ray Chest PA And Lateral (Final result)  Result time 10/23/22 11:13:14      Final result by Veronica Collins MD (10/23/22 11:13:14)                   Impression:      1. Pulmonary vascular congestion without definite edema.  2. Stable enlarged cardiac silhouette.  3. Osteopenia and degenerative change of the spine.      Electronically signed by: Veronica Collins MD  Date:    10/23/2022  Time:    11:13               Narrative:    EXAMINATION:  XR CHEST PA AND LATERAL    CLINICAL HISTORY:  Chest Pain;    TECHNIQUE:  PA and lateral views of the chest were performed.    COMPARISON:  Prior dated 09/22/2011    FINDINGS:  The mediastinal structures are midline.  The cardiac silhouette is mildly enlarged and stable.  There is pulmonary vascular congestion without definite edema.  There is osteopenia and degenerative change of the spine.                                       Medications   aluminum-magnesium hydroxide-simethicone 200-200-20 mg/5 mL suspension 30 mL (30 mLs Oral Given 10/23/22 1044)   LIDOcaine HCl 2% oral solution 15 mL (15 mLs Oral Given 10/23/22 1044)   dicyclomine 10 mg/5 mL syrup 20 mg  (20 mg Oral Given 10/23/22 1044)     Medical Decision Making:   Initial Assessment:   77-year-old female with upper midsternal chest pain accompanied by erect take a shin low-grade fever.  On physical exam she is afebrile nontoxic in no apparent distress breath sounds are clear to auscultation heart sounds are normal.  There is no leg swelling or edema.  She is in no apparent distress.  Rates her current level of pain 4/10.  She is currently hypertensive.  Patient's history significant for saba  fundoplication.  Differential Diagnosis:   ACS/MI, pulmonary embolus, musculoskeletal chest wall pain, gastroesophageal reflux disease  Clinical Tests:   Lab Tests: Ordered and Reviewed  Radiological Study: Reviewed and Ordered  Medical Tests: Ordered and Reviewed  Additional MDM:   Heart Score:    History:          Slightly suspicious.  ECG:             Normal  Age:               >65 years  Risk factors: >= 3 risk factors or history of atherosclerotic disease  Troponin:       Less than or equal to normal limit  Final Score: 4          ED Course as of 10/23/22 1316   Sun Oct 23, 2022   1005 BP(!): 203/81 [VC]   1006 Temp: 99.1 °F (37.3 °C) [VC]   1006 Temp src: Oral [VC]   1006 Pulse: 76 [VC]   1006 Resp: 17 [VC]   1006 SpO2: 98 % [VC]   1046 POC PTWBT: 13.7 [VC]   1046 POC PTINR: 1.2 [VC]   1046 BP(!): 188/86 [VC]   1046 Temp: 98.9 °F (37.2 °C) [VC]   1046 Temp src: Oral [VC]   1046 Pulse: 71 [VC]   1046 SpO2: 99 % [VC]   1046 POCT CBC  Elevated wbc, normal cbc otherwise. [VC]   1055 Influenza B Ag: negative [VC]   1057 Inflenza A Ag: negative [VC]   1057 POC Cardiac Troponin I: 0.01 [VC]   1057 Sample: unknown [VC]   1057 POCT CMP(!)  Essentially normal cmp. [VC]   1057 INDEPENDENT EKG INTERPRETATION: 0942 10/23/22.  NSR no ectopy, no stemi, rate 75.  [VC]   1132 POCT URINALYSIS W/O SCOPE(!)  Normal urinalysis. [VC]   1136 X-Ray Chest PA And Lateral  1. Pulmonary vascular congestion without definite edema.  2. Stable  enlarged cardiac silhouette.  3. Osteopenia and degenerative change of the spine. [VC]   1149 BP(!): 156/68 [VC]   1149 Pulse: 62 [VC]   1149 Resp: 17 [VC]   1149 SpO2: 96 % [VC]   1202 Sbar given to Dr. Rosales and to the patient.  Pt requests admit to [facility] as her cardiologist is there. [VC]   1218 Temp: 99 °F (37.2 °C) [VC]   1218 Temp src: Oral [VC]   1305 BP(!): 157/74 [VC]   1305 Pulse: 75 [VC]   1305 SpO2: 96 % [VC]   1312 SBAR givent o Dawit Carranza for Dr. Hernandez (accepting physician) [VC]      ED Course User Index  [VC] Byron Hwang DNP          Secondary to heart score of 4 I have opted the present place the patient on observation.  I attempted to get her to [place], the clinical site where her cardiologist practices however there was no rooms available.  Patient has agreed for placement at [facility].  Full report was given to Dawit Carranza who accepted the patient for Dr. Lemons.       Clinical Impression:   Final diagnoses:  [R07.9] Chest pain  [D72.829] Leukocytosis, unspecified type (Primary)        ED Disposition Condition    Observation Stable                Byron Hwang DNP  10/23/22 1316

## 2022-10-23 NOTE — HPI
"Janice Lucio White 77 y.o. female with HFpEF, polymalgia rheumatic no longer on Tx, JUNIOR presents to [facility] wit a chief complaint of chest pain.  She reports morning at 2:00 a.m. she developed sudden onset sternal chest pain at the base of her neck that she describes as a squeezing pain.  And has remained constant  Improved mildly with GI cocktail in the ED.  It worsens with palpation of her chest.  She attempted treatment at home with aspirin.  She reports sleeping on both of her sides, and is concerned this could have contributed to her chest pain as she states her bed is "old." She denies shortness breath nausea vomiting abdominal pain leg swelling syncope dizziness dysuria melena hematuria hematemesis new rashes or wounds.      In the ED, EKG without ST elevation, troponin negative, chest x-ray with pulmonary vascular congestion without overt edema.     Stress Test 2019 negative for ischemia.     04/24/18  Coronary calcium score is 209, 88th percentile.  Coronary age 79.  Ten year risk for a heart attack 10%, that would be intermediate to high risk.  Normal origin of the right and left main.  The left atrium is 35 mm.  Plaque also noted in the mid distal RCA below the Agatston's scoring threshold.  Progression from 2010 when the score was 32.  Liver 43, spleen 31.  No evidence of fatty liver from this study.  Radiology overread showed a tiny hiatal hernia significantly decreased in size from the prior exam.  "

## 2022-10-23 NOTE — ED NOTES
EMS arrived to transfer pt to , Pt is alert and oriented, in no apparent distress at this time.  Report given to medic and care turned over without incident.

## 2022-10-24 VITALS
RESPIRATION RATE: 17 BRPM | OXYGEN SATURATION: 95 % | WEIGHT: 185.63 LBS | SYSTOLIC BLOOD PRESSURE: 146 MMHG | HEIGHT: 61 IN | BODY MASS INDEX: 35.05 KG/M2 | DIASTOLIC BLOOD PRESSURE: 68 MMHG | HEART RATE: 67 BPM | TEMPERATURE: 99 F

## 2022-10-24 LAB
ALBUMIN SERPL BCP-MCNC: 3.7 G/DL (ref 3.5–5.2)
ALP SERPL-CCNC: 59 U/L (ref 55–135)
ALT SERPL W/O P-5'-P-CCNC: 17 U/L (ref 10–44)
ANION GAP SERPL CALC-SCNC: 8 MMOL/L (ref 8–16)
AORTIC ROOT ANNULUS: 2 CM
AST SERPL-CCNC: 19 U/L (ref 10–40)
AV PEAK GRADIENT: 19 MMHG
AV VELOCITY RATIO: 0.53
BASOPHILS # BLD AUTO: 0.04 K/UL (ref 0–0.2)
BASOPHILS NFR BLD: 0.4 % (ref 0–1.9)
BILIRUB SERPL-MCNC: 1.3 MG/DL (ref 0.1–1)
BSA FOR ECHO PROCEDURE: 1.9 M2
BUN SERPL-MCNC: 13 MG/DL (ref 8–23)
CALCIUM SERPL-MCNC: 9.2 MG/DL (ref 8.7–10.5)
CHLORIDE SERPL-SCNC: 107 MMOL/L (ref 95–110)
CO2 SERPL-SCNC: 26 MMOL/L (ref 23–29)
CREAT SERPL-MCNC: 1 MG/DL (ref 0.5–1.4)
CV ECHO LV RWT: 0.47 CM
DIFFERENTIAL METHOD: ABNORMAL
DOP CALC AO PEAK VEL: 2.18 M/S
DOP CALC LVOT AREA: 2 CM2
DOP CALC LVOT DIAMETER: 1.58 CM
DOP CALC LVOT PEAK VEL: 1.15 M/S
DOP CALC LVOT STROKE VOLUME: 48.8 CM3
DOP CALCLVOT PEAK VEL VTI: 24.9 CM
E WAVE DECELERATION TIME: 233.38 MSEC
E/A RATIO: 1.02
E/E' RATIO: 9.56 M/S
ECHO LV POSTERIOR WALL: 0.94 CM (ref 0.6–1.1)
EJECTION FRACTION: 60 %
EOSINOPHIL # BLD AUTO: 0 K/UL (ref 0–0.5)
EOSINOPHIL NFR BLD: 0.2 % (ref 0–8)
ERYTHROCYTE [DISTWIDTH] IN BLOOD BY AUTOMATED COUNT: 14.4 % (ref 11.5–14.5)
EST. GFR  (NO RACE VARIABLE): 58 ML/MIN/1.73 M^2
FRACTIONAL SHORTENING: 30 % (ref 28–44)
GLUCOSE SERPL-MCNC: 125 MG/DL (ref 70–110)
HCT VFR BLD AUTO: 45.6 % (ref 37–48.5)
HGB BLD-MCNC: 15 G/DL (ref 12–16)
IMM GRANULOCYTES # BLD AUTO: 0.03 K/UL (ref 0–0.04)
IMM GRANULOCYTES NFR BLD AUTO: 0.3 % (ref 0–0.5)
INTERVENTRICULAR SEPTUM: 1.09 CM (ref 0.6–1.1)
IVC DIAMETER: 2.53 CM
LA MAJOR: 5.89 CM
LA MINOR: 6.18 CM
LA WIDTH: 3.8 CM
LEFT ATRIUM SIZE: 5.85 CM
LEFT ATRIUM VOLUME INDEX: 62.3 ML/M2
LEFT ATRIUM VOLUME: 113.97 CM3
LEFT INTERNAL DIMENSION IN SYSTOLE: 2.8 CM (ref 2.1–4)
LEFT VENTRICLE DIASTOLIC VOLUME INDEX: 37.86 ML/M2
LEFT VENTRICLE DIASTOLIC VOLUME: 69.28 ML
LEFT VENTRICLE MASS INDEX: 70 G/M2
LEFT VENTRICLE SYSTOLIC VOLUME INDEX: 16.2 ML/M2
LEFT VENTRICLE SYSTOLIC VOLUME: 29.58 ML
LEFT VENTRICULAR INTERNAL DIMENSION IN DIASTOLE: 3.98 CM (ref 3.5–6)
LEFT VENTRICULAR MASS: 128.76 G
LV LATERAL E/E' RATIO: 8.6 M/S
LV SEPTAL E/E' RATIO: 10.75 M/S
LVOT MG: 3.86 MMHG
LVOT MV: 0.96 CM/S
LYMPHOCYTES # BLD AUTO: 2.2 K/UL (ref 1–4.8)
LYMPHOCYTES NFR BLD: 21.9 % (ref 18–48)
MCH RBC QN AUTO: 29.5 PG (ref 27–31)
MCHC RBC AUTO-ENTMCNC: 32.9 G/DL (ref 32–36)
MCV RBC AUTO: 90 FL (ref 82–98)
MONOCYTES # BLD AUTO: 1.2 K/UL (ref 0.3–1)
MONOCYTES NFR BLD: 11.9 % (ref 4–15)
MV PEAK A VEL: 0.84 M/S
MV PEAK E VEL: 0.86 M/S
MV STENOSIS PRESSURE HALF TIME: 67.68 MS
MV VALVE AREA P 1/2 METHOD: 3.25 CM2
NEUTROPHILS # BLD AUTO: 6.6 K/UL (ref 1.8–7.7)
NEUTROPHILS NFR BLD: 65.3 % (ref 38–73)
NRBC BLD-RTO: 0 /100 WBC
PISA TR MAX VEL: 3.26 M/S
PLATELET # BLD AUTO: 282 K/UL (ref 150–450)
PMV BLD AUTO: 10 FL (ref 9.2–12.9)
POTASSIUM SERPL-SCNC: 4.1 MMOL/L (ref 3.5–5.1)
PROT SERPL-MCNC: 7.2 G/DL (ref 6–8.4)
PV PEAK VELOCITY: 1.44 CM/S
RA MAJOR: 6.89 CM
RA PRESSURE: 3 MMHG
RA WIDTH: 4.8 CM
RBC # BLD AUTO: 5.08 M/UL (ref 4–5.4)
RIGHT VENTRICULAR END-DIASTOLIC DIMENSION: 4.43 CM
SINUS: 3.6 CM
SODIUM SERPL-SCNC: 141 MMOL/L (ref 136–145)
STJ: 2.74 CM
TDI LATERAL: 0.1 M/S
TDI SEPTAL: 0.08 M/S
TDI: 0.09 M/S
TR MAX PG: 43 MMHG
TRICUSPID ANNULAR PLANE SYSTOLIC EXCURSION: 1.3 CM
TROPONIN I SERPL DL<=0.01 NG/ML-MCNC: 0.03 NG/ML (ref 0–0.03)
TV REST PULMONARY ARTERY PRESSURE: 46 MMHG
WBC # BLD AUTO: 10.08 K/UL (ref 3.9–12.7)

## 2022-10-24 PROCEDURE — 85025 COMPLETE CBC W/AUTO DIFF WBC: CPT | Performed by: PHYSICIAN ASSISTANT

## 2022-10-24 PROCEDURE — 25000003 PHARM REV CODE 250: Performed by: STUDENT IN AN ORGANIZED HEALTH CARE EDUCATION/TRAINING PROGRAM

## 2022-10-24 PROCEDURE — 80053 COMPREHEN METABOLIC PANEL: CPT | Performed by: PHYSICIAN ASSISTANT

## 2022-10-24 PROCEDURE — 84484 ASSAY OF TROPONIN QUANT: CPT | Performed by: PHYSICIAN ASSISTANT

## 2022-10-24 PROCEDURE — 25000003 PHARM REV CODE 250: Performed by: PHYSICIAN ASSISTANT

## 2022-10-24 PROCEDURE — G0378 HOSPITAL OBSERVATION PER HR: HCPCS

## 2022-10-24 PROCEDURE — 36415 COLL VENOUS BLD VENIPUNCTURE: CPT | Performed by: PHYSICIAN ASSISTANT

## 2022-10-24 RX ORDER — EZETIMIBE 10 MG/1
10 TABLET ORAL DAILY
Qty: 90 TABLET | Refills: 3
Start: 2022-10-24 | End: 2023-10-24

## 2022-10-24 RX ORDER — ROSUVASTATIN CALCIUM 10 MG/1
10 TABLET, COATED ORAL NIGHTLY
Qty: 90 TABLET | Refills: 3
Start: 2022-10-24 | End: 2023-10-24

## 2022-10-24 RX ADMIN — ASPIRIN 81 MG CHEWABLE TABLET 81 MG: 81 TABLET CHEWABLE at 09:10

## 2022-10-24 RX ADMIN — FUROSEMIDE 20 MG: 20 TABLET ORAL at 09:10

## 2022-10-24 RX ADMIN — SACUBITRIL AND VALSARTAN 2 TABLET: 24; 26 TABLET, FILM COATED ORAL at 09:10

## 2022-10-24 RX ADMIN — EZETIMIBE 10 MG: 10 TABLET ORAL at 09:10

## 2022-10-24 NOTE — PLAN OF CARE
10/24/22 0800   Discharge Planning   Assessment Type Discharge Planning Brief Assessment   Resource/Environmental Concerns none   Support Systems Family members   Equipment Currently Used at Home none   Current Living Arrangements home/apartment/condo   Patient/Family Anticipates Transition to home with family   Patient/Family Anticipated Services at Transition none   DME Needed Upon Discharge  none   Discharge Plan A Home with family  (with instructions to dada singh)     [facility] [address] [place] [address]  Phone: [phone] Fax: [phone]

## 2022-10-24 NOTE — NURSING
Report received, care assumed.    Patient resting in bed with no acute distress.  Denies chest pain at the present time.  Call bell in reach.  Will continue to monitor.

## 2022-10-24 NOTE — NURSING
Report given to oncoming nurse.  Rates chest pain 2/10, states she has some relief from Lidocaine patch.  Call bell in reach.

## 2022-10-24 NOTE — PLAN OF CARE
10/24/22 0852   Final Note   Assessment Type Final Discharge Note   Anticipated Discharge Disposition Home   Hospital Resources/Appts/Education Provided Appointments scheduled and added to AVS   Post-Acute Status   Post-Acute Authorization Other   Other Status No Post-Acute Service Needs   Pts nurse Rui notified that the pt can d/c from CM standpoint

## 2022-10-24 NOTE — NURSING
Discharge instruction provided to the patient, included but not limited to medications and follow up appointments.  IV  and tele discontinued.  Escorted to private vehicle via patient escort.

## 2022-10-24 NOTE — DISCHARGE SUMMARY
"[facility]  Discharge Summary      Patient Name: Janice Rivera  MRN: [id number]  Patient Class: OP- Observation  Admission Date: 10/23/2022  Hospital Length of Stay: 0 days  Discharge Date and Time:  10/24/2022 1:55 PM  Attending Physician: Sandy Gutierrez MD   Discharging Provider: Luisa Hanna NP  Primary Care Provider: Juli Melendez MD      HPI:   Janice Rivera 77 y.o. female with HFpEF, polymalgia rheumatic no longer on Tx, JUNIOR presents to [facility] wit a chief complaint of chest pain.  She reports morning at 2:00 a.m. she developed sudden onset sternal chest pain at the base of her neck that she describes as a squeezing pain.  And has remained constant  Improved mildly with GI cocktail in the ED.  It worsens with palpation of her chest.  She attempted treatment at home with aspirin.  She reports sleeping on both of her sides, and is concerned this could have contributed to her chest pain as she states her bed is "old." She denies shortness breath nausea vomiting abdominal pain leg swelling syncope dizziness dysuria melena hematuria hematemesis new rashes or wounds.      In the ED, EKG without ST elevation, troponin negative, chest x-ray with pulmonary vascular congestion without overt edema.     Stress Test 2019 negative for ischemia.     04/24/18  Coronary calcium score is 209, 88th percentile.  Coronary age 79.  Ten year risk for a heart attack 10%, that would be intermediate to high risk.  Normal origin of the right and left main.  The left atrium is 35 mm.  Plaque also noted in the mid distal RCA below the Agatston's scoring threshold.  Progression from 2010 when the score was 32.  Liver 43, spleen 31.  No evidence of fatty liver from this study.  Radiology overread showed a tiny hiatal hernia significantly decreased in size from the prior exam.      * No surgery found *      Hospital Course:   Admit to observation for chest pain. Chest pain felt due to " musculoskeletal. Lidocaine patch added with improvement in symptoms. Pain resolved. Mild elevated troponin due to elevated blood pressure. Troponin trend flat pattern.  Blood pressure improved with restart home antihypertensives. Repeat 2 D e cho showed similar result as the the one in August EF 60%, indeterminate diastolic function, mod PH 46 mmhg and normal wall motion. Patient stable for discharge home. Discussed log blood pressure and bring to next Cardiology/PCP follow up.     Goals of Care Treatment Preferences:  Code Status: Full Code      Consults:   Consults (From admission, onward)        Status Ordering Provider     Case Management/  Once        Provider:  (Not yet assigned)    EILEEN Zapata new Assessment & Plan notes have been filed under this hospital service since the last note was generated.  Service: Hospital Medicine    Final Active Diagnoses:    Diagnosis Date Noted POA    PRINCIPAL PROBLEM:  Chest pain [R07.9] 10/23/2022 Unknown    JUNIOR (obstructive sleep apnea) [G47.33] 10/23/2022 Unknown    Polymyalgia rheumatica [M35.3] 03/05/2013 Yes     Chronic    Diastolic dysfunction [I51.89] 03/05/2013 Yes     Chronic      Problems Resolved During this Admission:       Discharged Condition: good    Disposition: Home or Self Care    Follow Up:   Follow-up Information     Juli Melendez MD Follow up.    Specialty: Internal Medicine  Why: As needed  Contact information:  [facility] [address] [address] [phone]                       Patient Instructions:      Diet Cardiac     Notify your health care provider if you experience any of the following:  temperature >100.4     Notify your health care provider if you experience any of the following:  difficulty breathing or increased cough     Notify your health care provider if you experience any of the following:  increased confusion or weakness     Activity as tolerated       Significant  Diagnostic Studies: Labs: All labs within the past 24 hours have been reviewed    Pending Diagnostic Studies:     None         Medications:  Reconciled Home Medications:      Medication List      START taking these medications    ezetimibe 10 mg tablet  Commonly known as: ZETIA  Take 1 tablet (10 mg total) by mouth once daily.     rosuvastatin 10 MG tablet  Commonly known as: CRESTOR  Take 1 tablet (10 mg total) by mouth every evening.     sacubitriL-valsartan 24-26 mg per tablet  Commonly known as: ENTRESTO  Take 2 tablets by mouth 2 (two) times daily.        CONTINUE taking these medications    aspirin 81 MG Chew  Take 81 mg by mouth once daily.     biotin 1 mg tablet  Take 1,000 mcg by mouth 3 (three) times daily.     CITRACAL + D ORAL  Take by mouth.     folic acid 1 MG tablet  Commonly known as: FOLVITE  Take 1 mg by mouth once daily.     furosemide 20 MG tablet  Commonly known as: LASIX  Take 20 mg by mouth once daily.     OMEGA 3 FISH OIL ORAL  Take by mouth.     peg 400-propylene glycol 0.4-0.3 % Drop  Apply to eye.     pyridoxine (vitamin B6) 100 MG Tab  Commonly known as: B-6  Take 50 mg by mouth once daily.     VITAMIN D-3 ORAL  Take 2,000 Int'l Units/day by mouth.        ASK your doctor about these medications    vitamin D 1000 units Tab  Commonly known as: VITAMIN D3  Take 185 mg by mouth once daily.            Indwelling Lines/Drains at time of discharge:   Lines/Drains/Airways     None                 Time spent on the discharge of patient: 30 minutes         Luisa Hanna NP  Department of Hospital Medicine  [facility]

## 2022-10-24 NOTE — HOSPITAL COURSE
Admit to observation for chest pain. Chest pain felt due to musculoskeletal. Lidocaine patch added with improvement in symptoms. Pain resolved. Mild elevated troponin due to elevated blood pressure. Troponin trend flat pattern.  Blood pressure improved with restart home antihypertensives. Repeat 2 D e cho showed similar result as the the one in August EF 60%, indeterminate diastolic function, mod PH 46 mmhg and normal wall motion. Patient stable for discharge home.

## 2023-05-18 ENCOUNTER — HOSPITAL ENCOUNTER (OUTPATIENT)
Dept: RADIOLOGY | Facility: HOSPITAL | Age: 78
Discharge: HOME | End: 2023-05-18
Attending: OBSTETRICS & GYNECOLOGY
Payer: MEDICARE

## 2023-05-18 DIAGNOSIS — Z12.31 BREAST CANCER SCREENING BY MAMMOGRAM: ICD-10-CM

## 2023-05-18 PROCEDURE — 77067 MAMMO DIGITAL SCREENING BILAT WITH TOMO: ICD-10-PCS | Mod: 26,,, | Performed by: RADIOLOGY

## 2023-05-18 PROCEDURE — 77067 SCR MAMMO BI INCL CAD: CPT | Mod: 26,,, | Performed by: RADIOLOGY

## 2023-05-18 PROCEDURE — 77063 BREAST TOMOSYNTHESIS BI: CPT | Mod: 26,,, | Performed by: RADIOLOGY

## 2023-05-18 PROCEDURE — 77063 MAMMO DIGITAL SCREENING BILAT WITH TOMO: ICD-10-PCS | Mod: 26,,, | Performed by: RADIOLOGY

## 2023-05-18 PROCEDURE — 77067 SCR MAMMO BI INCL CAD: CPT | Mod: TC

## 2024-04-18 DIAGNOSIS — Z12.31 ENCOUNTER FOR SCREENING MAMMOGRAM FOR MALIGNANT NEOPLASM OF BREAST: Primary | ICD-10-CM

## 2024-05-21 ENCOUNTER — HOSPITAL ENCOUNTER (OUTPATIENT)
Dept: RADIOLOGY | Facility: HOSPITAL | Age: 79
Discharge: HOME | End: 2024-05-21
Attending: OBSTETRICS & GYNECOLOGY
Payer: MEDICARE

## 2024-05-21 VITALS — WEIGHT: 185.63 LBS | HEIGHT: 61 IN | BODY MASS INDEX: 35.05 KG/M2

## 2024-05-21 DIAGNOSIS — Z12.31 ENCOUNTER FOR SCREENING MAMMOGRAM FOR MALIGNANT NEOPLASM OF BREAST: ICD-10-CM

## 2024-05-21 PROCEDURE — 77063 BREAST TOMOSYNTHESIS BI: CPT | Mod: 26,,, | Performed by: RADIOLOGY

## 2024-05-21 PROCEDURE — 77067 SCR MAMMO BI INCL CAD: CPT | Mod: TC

## 2024-05-21 PROCEDURE — 77063 BREAST TOMOSYNTHESIS BI: CPT | Mod: TC

## 2024-05-21 PROCEDURE — 77067 SCR MAMMO BI INCL CAD: CPT | Mod: 26,,, | Performed by: RADIOLOGY
